# Patient Record
Sex: FEMALE | Race: OTHER | HISPANIC OR LATINO | URBAN - METROPOLITAN AREA
[De-identification: names, ages, dates, MRNs, and addresses within clinical notes are randomized per-mention and may not be internally consistent; named-entity substitution may affect disease eponyms.]

---

## 2021-09-19 ENCOUNTER — HOSPITAL ENCOUNTER (EMERGENCY)
Facility: HOSPITAL | Age: 26
Discharge: HOME | End: 2021-09-20
Attending: EMERGENCY MEDICINE
Payer: OTHER GOVERNMENT

## 2021-09-19 DIAGNOSIS — J06.9 UPPER RESPIRATORY TRACT INFECTION DUE TO COVID-19 VIRUS: Primary | ICD-10-CM

## 2021-09-19 DIAGNOSIS — U07.1 UPPER RESPIRATORY TRACT INFECTION DUE TO COVID-19 VIRUS: Primary | ICD-10-CM

## 2021-09-19 DIAGNOSIS — Z34.90 PREGNANCY, UNSPECIFIED GESTATIONAL AGE: ICD-10-CM

## 2021-09-19 LAB
CTP QC/QA: YES
SARS-COV-2 RDRP RESP QL NAA+PROBE: POSITIVE

## 2021-09-19 PROCEDURE — 99284 EMERGENCY DEPT VISIT MOD MDM: CPT | Mod: 25

## 2021-09-19 PROCEDURE — 25000003 PHARM REV CODE 250: Performed by: EMERGENCY MEDICINE

## 2021-09-19 PROCEDURE — U0002 COVID-19 LAB TEST NON-CDC: HCPCS | Performed by: EMERGENCY MEDICINE

## 2021-09-19 RX ORDER — ACETAMINOPHEN 500 MG
1000 TABLET ORAL
Status: COMPLETED | OUTPATIENT
Start: 2021-09-19 | End: 2021-09-19

## 2021-09-19 RX ADMIN — ACETAMINOPHEN 1000 MG: 500 TABLET ORAL at 10:09

## 2021-09-20 VITALS
TEMPERATURE: 98 F | HEART RATE: 110 BPM | OXYGEN SATURATION: 99 % | DIASTOLIC BLOOD PRESSURE: 71 MMHG | RESPIRATION RATE: 20 BRPM | SYSTOLIC BLOOD PRESSURE: 117 MMHG

## 2021-09-20 RX ORDER — ALBUTEROL SULFATE 90 UG/1
1-2 AEROSOL, METERED RESPIRATORY (INHALATION) EVERY 6 HOURS PRN
Qty: 18 G | Refills: 0 | Status: SHIPPED | OUTPATIENT
Start: 2021-09-20 | End: 2022-09-20

## 2021-09-20 RX ORDER — ACETAMINOPHEN 500 MG
1000 TABLET ORAL EVERY 6 HOURS PRN
Qty: 30 TABLET | Refills: 0 | Status: SHIPPED | OUTPATIENT
Start: 2021-09-20

## 2022-04-01 ENCOUNTER — TELEPHONE (OUTPATIENT)
Dept: TRANSPLANT | Facility: CLINIC | Age: 27
End: 2022-04-01
Payer: OTHER GOVERNMENT

## 2022-04-01 NOTE — TELEPHONE ENCOUNTER
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 She may need  transplant in the future and getting her plugged in with you would be awesome. I let her know she may be hearing from [facility] - she is Macedonian-speaking only. Let me know what you think - I'm adding her info below:     Name: Rixy Rivera Reyes   : 1995   Ph #: [phone]   Ph #: [phone]   Insurance: Medicaid     Ms. Rivera Reyes is a 27 yo Macedonian-speaking female with a PMHx of COVID-19 PNA (while 38 weeks pregnant) complicated by hypoxic respiratory failure requiring intubation x2 days in 2021. Patient had a 3-month hospital stay (discharged on Dec 24th with 5L O2) complicated by sepsis due to UTI and bacteremia, submassive saddle PE in Nov s/p EKOS, R-sided PTX requiring small bore chest tube, and post-partum depression.       She was re-admitted on 21 for dyspnea, cough, and hypoxic respiratory failure noted to have diffuse fibrotic changes on CT chest. Patient was placed on a steroid taper as recommended by me during her inpatient stay. She was discharged on  on 3L NC with O2 sats in the mid 90's. She has received the first two COVID vaccines (2nd shot administered on ).       She has now seen me in clinic twice. She has exertional dyspnea and a 5L O2 requirement when exerting herself (1-2L at rest), but has not gotten her PFT or 6MWT done yet. I have weaned her off of prednisone and she didn't tolerate MMF initiation. I plan to get her started on Ofev.     CT chest 1/10/22:   Bilateral GGO with evidence of traction bronchiectasis, subpleural reticulation, and septal thickening concerning for fibrosis.       ECHO 21:   LVEF 60-65%   Normal LV diastolic function   Normal RV function   No  elevation in PA pressures     Thanks!   Niru Cruz, DO Niru Ramirez MD; Diana Soto RN  Caller: Unspecified (Yesterday, 12:57 PM)    Thanks Niru!  We will get her into the pre-LUT clinic.  Meli could you schedule in pre-LUT clinic for a consult?  Thanks       4/1/22 - Message sent to Dr. Ramirez requesting medical records including demographic and insurance information.    Message sent to Dr. Cruz inquiring if any testing is required prior to consult appointment.       ----- Message -----   From: Diana Soto RN   Sent: 4/1/2022   1:27 PM CDT   To: Katelin Cruz DO   Subject: RE: Post-COVID Fibrosis Pre-Transplant Evalu*     Do you want any testing?       Katelin Cruz, DO Diana Soto RN     PFTs and 6MWT please      4/5/22 - Medical records received.  Sent to [facility] for financial clearance for consult with PFTs and 6 minute walk test.    4/25/22 - Received financial clearance for lung transplant consult with PFTs and a 6 minute walk test prior.  Contacted [employer] for an .  Connected with [employer] ([id number]).  Mimi attempted to contact patient.  Reached patient's .  Mimi notified patient's  of the referral to our department.  Inquired if patient is willing to schedule an appointment to discuss lung transplant consideration.  Informed that patient is willing to schedule an appointment.  Notified him of our clinic availability with the first available appointment on 5/5/22.  Also informed him of the testing needed (PFTs, 6 minute walk test).  Patient's  stated that patient has an appointment on 5/3/22 at [facility].  He also stated that he thinks she has PFTs and a 6 minute walk test ordered that day.  He stated that he is only able to take off 1 day each week from work.  Informed him of additional clinic availability on 5/10, 5/12, and 5/17/22.  He requested an appointment on 5/17/22.  Informed him that I  would speak with Dr. Ramirez regarding patient's upcoming visit to determine if patient will be having PFTs and a 6 minute walk test.  Informed him that if patient will have the testing on 5/3/22 prior to Dr. Ramirez's appointment, then we will only need to schedule the consult appointment.  Informed him that the consult appointment will be scheduled for 11 am on 5/17/22.  Informed him that if patient does not have the PFTs and a 6 minute walk test scheduled on 5/3/22, then we will need to schedule the testing prior to the consult appointment at 11 am.  Informed him that they will be contacted to confirm the appointment times and testing.  He verbalized his understanding of all discussed and denied having any questions.  Provided him with our phone number and address should they have any questions or concerns regarding the appointment.    Message sent to Dr. Ramirez regarding patient's next appointment with her and the testing scheduled (if any).

## 2022-05-17 ENCOUNTER — OFFICE VISIT (OUTPATIENT)
Dept: TRANSPLANT | Facility: CLINIC | Age: 27
End: 2022-05-17
Payer: MEDICAID

## 2022-05-17 VITALS
HEART RATE: 77 BPM | OXYGEN SATURATION: 100 % | HEIGHT: 60 IN | DIASTOLIC BLOOD PRESSURE: 65 MMHG | WEIGHT: 160 LBS | SYSTOLIC BLOOD PRESSURE: 133 MMHG | TEMPERATURE: 98 F | RESPIRATION RATE: 20 BRPM | BODY MASS INDEX: 31.41 KG/M2

## 2022-05-17 DIAGNOSIS — J84.10 PULMONARY FIBROSIS: Primary | ICD-10-CM

## 2022-05-17 DIAGNOSIS — Z76.82 LUNG TRANSPLANT CANDIDATE: ICD-10-CM

## 2022-05-17 DIAGNOSIS — J96.11 CHRONIC RESPIRATORY FAILURE WITH HYPOXIA: ICD-10-CM

## 2022-05-17 PROCEDURE — 1159F MED LIST DOCD IN RCRD: CPT | Mod: CPTII,TXP,, | Performed by: INTERNAL MEDICINE

## 2022-05-17 PROCEDURE — 99204 PR OFFICE/OUTPT VISIT, NEW, LEVL IV, 45-59 MIN: ICD-10-PCS | Mod: S$PBB,TXP,, | Performed by: INTERNAL MEDICINE

## 2022-05-17 PROCEDURE — 3008F BODY MASS INDEX DOCD: CPT | Mod: CPTII,TXP,, | Performed by: INTERNAL MEDICINE

## 2022-05-17 PROCEDURE — 3075F SYST BP GE 130 - 139MM HG: CPT | Mod: CPTII,TXP,, | Performed by: INTERNAL MEDICINE

## 2022-05-17 PROCEDURE — 99999 PR PBB SHADOW E&M-EST. PATIENT-LVL III: ICD-10-PCS | Mod: PBBFAC,TXP,, | Performed by: INTERNAL MEDICINE

## 2022-05-17 PROCEDURE — 3075F PR MOST RECENT SYSTOLIC BLOOD PRESS GE 130-139MM HG: ICD-10-PCS | Mod: CPTII,TXP,, | Performed by: INTERNAL MEDICINE

## 2022-05-17 PROCEDURE — 99213 OFFICE O/P EST LOW 20 MIN: CPT | Mod: PBBFAC,TXP | Performed by: INTERNAL MEDICINE

## 2022-05-17 PROCEDURE — 99204 OFFICE O/P NEW MOD 45 MIN: CPT | Mod: S$PBB,TXP,, | Performed by: INTERNAL MEDICINE

## 2022-05-17 PROCEDURE — 3008F PR BODY MASS INDEX (BMI) DOCUMENTED: ICD-10-PCS | Mod: CPTII,TXP,, | Performed by: INTERNAL MEDICINE

## 2022-05-17 PROCEDURE — 3078F PR MOST RECENT DIASTOLIC BLOOD PRESSURE < 80 MM HG: ICD-10-PCS | Mod: CPTII,TXP,, | Performed by: INTERNAL MEDICINE

## 2022-05-17 PROCEDURE — 1160F PR REVIEW ALL MEDS BY PRESCRIBER/CLIN PHARMACIST DOCUMENTED: ICD-10-PCS | Mod: CPTII,TXP,, | Performed by: INTERNAL MEDICINE

## 2022-05-17 PROCEDURE — 99999 PR PBB SHADOW E&M-EST. PATIENT-LVL III: CPT | Mod: PBBFAC,TXP,, | Performed by: INTERNAL MEDICINE

## 2022-05-17 PROCEDURE — 1159F PR MEDICATION LIST DOCUMENTED IN MEDICAL RECORD: ICD-10-PCS | Mod: CPTII,TXP,, | Performed by: INTERNAL MEDICINE

## 2022-05-17 PROCEDURE — 1160F RVW MEDS BY RX/DR IN RCRD: CPT | Mod: CPTII,TXP,, | Performed by: INTERNAL MEDICINE

## 2022-05-17 PROCEDURE — 3078F DIAST BP <80 MM HG: CPT | Mod: CPTII,TXP,, | Performed by: INTERNAL MEDICINE

## 2022-05-17 NOTE — LETTER
May 17, 2022        Niru Ramirez  [address] [facility] [address]  Phone: [phone]  Fax: [phone]             [facility] Trans[facility] 1st Fl  [address]  [facility] [address]  Phone: [phone]   Patient: Rixy Yormany Rivera Reyes   MR Number: [id number]   YOB: 1995   Date of Visit: 5/17/2022       Dear Dr. Niru Ramirez    Thank you for referring Rixy Rivera Reyes to me for evaluation. Attached you will find relevant portions of my assessment and plan of care.    If you have questions, please do not hesitate to call me. I look forward to following Rixy Rivera Reyes along with you.    Sincerely,    Katelin Cruz, DO    Enclosure    If you would like to receive this communication electronically, please contact [email or web address] or [phone] to request [employer] access.    [employer] is a tool which provides read-only access to select patient information with whom you have a relationship. Its easy to use and provides real time access to review your patients record including encounter summaries, notes, results, and demographic information.    If you feel you have received this communication in error or would no longer like to receive these types of communications, please e-mail [email or web address]

## 2022-05-23 ENCOUNTER — TELEPHONE (OUTPATIENT)
Dept: TRANSPLANT | Facility: CLINIC | Age: 27
End: 2022-05-23
Payer: MEDICAID

## 2022-05-26 NOTE — TELEPHONE ENCOUNTER
5/23/22 - Discussed initiation of OFEV and follow-up appointment with Dr. Cruz.  Informed per Dr. Cruz that Dr. Ramirez will prescribe and manage the OFEV.  Instructions received for a follow-up appointment in 6 months with PFTs and a 6 minute walk test if patient is agreeable to the appointment since she is not interested in lung transplant at this time.

## 2022-10-12 ENCOUNTER — TELEPHONE (OUTPATIENT)
Dept: TRANSPLANT | Facility: CLINIC | Age: 27
End: 2022-10-12
Payer: MEDICAID

## 2022-12-14 ENCOUNTER — TELEPHONE (OUTPATIENT)
Dept: TRANSPLANT | Facility: CLINIC | Age: 27
End: 2022-12-14
Payer: MEDICAID

## 2022-12-16 DIAGNOSIS — J84.10 PULMONARY FIBROSIS: ICD-10-CM

## 2022-12-16 DIAGNOSIS — J96.11 CHRONIC RESPIRATORY FAILURE WITH HYPOXIA: Primary | ICD-10-CM

## 2022-12-16 DIAGNOSIS — Z76.82 LUNG TRANSPLANT CANDIDATE: ICD-10-CM

## 2022-12-18 NOTE — PROGRESS NOTES
Orders for testing with provider visit per Dr. Cruz: PFTs, 6MWT. Noted needs . Note OFEV is managed by Dr. Ramirez, however labs completed 12/2021, 2/2022, 9/2022.

## 2023-01-17 ENCOUNTER — TELEPHONE (OUTPATIENT)
Dept: TRANSPLANT | Facility: CLINIC | Age: 28
End: 2023-01-17
Payer: MEDICAID

## 2023-01-17 NOTE — LETTER
January 17, 2023    [address] [place] [address]          Dear Rixy Yormany Rivera Reyes:    Patient: Rixy Yormany Rivera Reyes   MR Number: [id number]   YOB: 1995     We hope this letter finds you well. You missed your appointment in the lung transplant department that was scheduled on January 17, 2023. Please contact us at [phone] to re-schedule your appointment.                                                                               Sincerely,     Katelin Cruz D.O.  Medical Director, Lung Transplant  Pulmonary & Critical Care Medicine  [facility]  [address] [place] [id number]  [phone] tel  [phone] fax    CC: Dr. Niru Ramirez

## 2023-01-17 NOTE — TELEPHONE ENCOUNTER
Attempted to reach patient to verify coming to appointment. No answer, left voicemail. Per  [facility], unable to reach patient as well. Letter sent regarding missed appt.